# Patient Record
Sex: MALE | Race: OTHER | HISPANIC OR LATINO
[De-identification: names, ages, dates, MRNs, and addresses within clinical notes are randomized per-mention and may not be internally consistent; named-entity substitution may affect disease eponyms.]

---

## 2017-05-22 ENCOUNTER — APPOINTMENT (OUTPATIENT)
Dept: PULMONOLOGY | Facility: CLINIC | Age: 82
End: 2017-05-22

## 2017-10-04 ENCOUNTER — INPATIENT (INPATIENT)
Facility: HOSPITAL | Age: 82
LOS: 2 days | Discharge: HOSPICE | DRG: 65 | End: 2017-10-07
Attending: SURGERY | Admitting: SURGERY
Payer: MEDICARE

## 2017-10-04 VITALS
HEIGHT: 65 IN | WEIGHT: 130.07 LBS | OXYGEN SATURATION: 97 % | DIASTOLIC BLOOD PRESSURE: 82 MMHG | TEMPERATURE: 98 F | HEART RATE: 98 BPM | RESPIRATION RATE: 18 BRPM | SYSTOLIC BLOOD PRESSURE: 136 MMHG

## 2017-10-04 DIAGNOSIS — I62.00 NONTRAUMATIC SUBDURAL HEMORRHAGE, UNSPECIFIED: ICD-10-CM

## 2017-10-04 LAB
ALBUMIN SERPL ELPH-MCNC: 4.3 G/DL — SIGNIFICANT CHANGE UP (ref 3.3–5.2)
ALP SERPL-CCNC: 66 U/L — SIGNIFICANT CHANGE UP (ref 40–120)
ALT FLD-CCNC: 25 U/L — SIGNIFICANT CHANGE UP
ANION GAP SERPL CALC-SCNC: 11 MMOL/L — SIGNIFICANT CHANGE UP (ref 5–17)
APPEARANCE UR: CLEAR — SIGNIFICANT CHANGE UP
APTT BLD: 29.5 SEC — SIGNIFICANT CHANGE UP (ref 27.5–37.4)
AST SERPL-CCNC: 23 U/L — SIGNIFICANT CHANGE UP
BASOPHILS # BLD AUTO: 0 K/UL — SIGNIFICANT CHANGE UP (ref 0–0.2)
BASOPHILS NFR BLD AUTO: 0.4 % — SIGNIFICANT CHANGE UP (ref 0–2)
BILIRUB SERPL-MCNC: 0.4 MG/DL — SIGNIFICANT CHANGE UP (ref 0.4–2)
BILIRUB UR-MCNC: NEGATIVE — SIGNIFICANT CHANGE UP
BLD GP AB SCN SERPL QL: SIGNIFICANT CHANGE UP
BUN SERPL-MCNC: 25 MG/DL — HIGH (ref 8–20)
CALCIUM SERPL-MCNC: 9.8 MG/DL — SIGNIFICANT CHANGE UP (ref 8.6–10.2)
CHLORIDE SERPL-SCNC: 100 MMOL/L — SIGNIFICANT CHANGE UP (ref 98–107)
CO2 SERPL-SCNC: 26 MMOL/L — SIGNIFICANT CHANGE UP (ref 22–29)
COLOR SPEC: YELLOW — SIGNIFICANT CHANGE UP
CREAT SERPL-MCNC: 1.24 MG/DL — SIGNIFICANT CHANGE UP (ref 0.5–1.3)
DIFF PNL FLD: NEGATIVE — SIGNIFICANT CHANGE UP
EOSINOPHIL # BLD AUTO: 0.7 K/UL — HIGH (ref 0–0.5)
EOSINOPHIL NFR BLD AUTO: 6.8 % — HIGH (ref 0–5)
EPI CELLS # UR: SIGNIFICANT CHANGE UP
GLUCOSE SERPL-MCNC: 160 MG/DL — HIGH (ref 70–115)
GLUCOSE UR QL: NEGATIVE MG/DL — SIGNIFICANT CHANGE UP
HCT VFR BLD CALC: 44.2 % — SIGNIFICANT CHANGE UP (ref 42–52)
HGB BLD-MCNC: 15 G/DL — SIGNIFICANT CHANGE UP (ref 14–18)
INR BLD: 1.08 RATIO — SIGNIFICANT CHANGE UP (ref 0.88–1.16)
KETONES UR-MCNC: NEGATIVE — SIGNIFICANT CHANGE UP
LEUKOCYTE ESTERASE UR-ACNC: NEGATIVE — SIGNIFICANT CHANGE UP
LYMPHOCYTES # BLD AUTO: 0.9 K/UL — LOW (ref 1–4.8)
LYMPHOCYTES # BLD AUTO: 9.5 % — LOW (ref 20–55)
MAGNESIUM SERPL-MCNC: 2.3 MG/DL — SIGNIFICANT CHANGE UP (ref 1.6–2.6)
MCHC RBC-ENTMCNC: 31.1 PG — HIGH (ref 27–31)
MCHC RBC-ENTMCNC: 33.9 G/DL — SIGNIFICANT CHANGE UP (ref 32–36)
MCV RBC AUTO: 91.7 FL — SIGNIFICANT CHANGE UP (ref 80–94)
MONOCYTES # BLD AUTO: 0.5 K/UL — SIGNIFICANT CHANGE UP (ref 0–0.8)
MONOCYTES NFR BLD AUTO: 5.3 % — SIGNIFICANT CHANGE UP (ref 3–10)
NEUTROPHILS # BLD AUTO: 7.6 K/UL — SIGNIFICANT CHANGE UP (ref 1.8–8)
NEUTROPHILS NFR BLD AUTO: 77.9 % — HIGH (ref 37–73)
NITRITE UR-MCNC: NEGATIVE — SIGNIFICANT CHANGE UP
PH UR: 6 — SIGNIFICANT CHANGE UP (ref 5–8)
PLATELET # BLD AUTO: 232 K/UL — SIGNIFICANT CHANGE UP (ref 150–400)
POTASSIUM SERPL-MCNC: 5.1 MMOL/L — SIGNIFICANT CHANGE UP (ref 3.5–5.3)
POTASSIUM SERPL-SCNC: 5.1 MMOL/L — SIGNIFICANT CHANGE UP (ref 3.5–5.3)
PROT SERPL-MCNC: 7.8 G/DL — SIGNIFICANT CHANGE UP (ref 6.6–8.7)
PROT UR-MCNC: 15 MG/DL
PROTHROM AB SERPL-ACNC: 11.9 SEC — SIGNIFICANT CHANGE UP (ref 9.8–12.7)
RBC # BLD: 4.82 M/UL — SIGNIFICANT CHANGE UP (ref 4.6–6.2)
RBC # FLD: 12.9 % — SIGNIFICANT CHANGE UP (ref 11–15.6)
SODIUM SERPL-SCNC: 137 MMOL/L — SIGNIFICANT CHANGE UP (ref 135–145)
SP GR SPEC: 1.01 — SIGNIFICANT CHANGE UP (ref 1.01–1.02)
TROPONIN T SERPL-MCNC: <0.01 NG/ML — SIGNIFICANT CHANGE UP (ref 0–0.06)
TSH SERPL-MCNC: 1.35 UIU/ML — SIGNIFICANT CHANGE UP (ref 0.27–4.2)
TYPE + AB SCN PNL BLD: SIGNIFICANT CHANGE UP
UROBILINOGEN FLD QL: NEGATIVE MG/DL — SIGNIFICANT CHANGE UP
WBC # BLD: 9.7 K/UL — SIGNIFICANT CHANGE UP (ref 4.8–10.8)
WBC # FLD AUTO: 9.7 K/UL — SIGNIFICANT CHANGE UP (ref 4.8–10.8)
WBC UR QL: SIGNIFICANT CHANGE UP

## 2017-10-04 PROCEDURE — 99222 1ST HOSP IP/OBS MODERATE 55: CPT

## 2017-10-04 PROCEDURE — 93010 ELECTROCARDIOGRAM REPORT: CPT

## 2017-10-04 PROCEDURE — 99285 EMERGENCY DEPT VISIT HI MDM: CPT

## 2017-10-04 PROCEDURE — 70450 CT HEAD/BRAIN W/O DYE: CPT | Mod: 26,77

## 2017-10-04 PROCEDURE — 71010: CPT | Mod: 26

## 2017-10-04 PROCEDURE — 70450 CT HEAD/BRAIN W/O DYE: CPT | Mod: 26

## 2017-10-04 RX ORDER — GLUCAGON INJECTION, SOLUTION 0.5 MG/.1ML
1 INJECTION, SOLUTION SUBCUTANEOUS ONCE
Qty: 0 | Refills: 0 | Status: DISCONTINUED | OUTPATIENT
Start: 2017-10-04 | End: 2017-10-05

## 2017-10-04 RX ORDER — LEVETIRACETAM 250 MG/1
1000 TABLET, FILM COATED ORAL ONCE
Qty: 0 | Refills: 0 | Status: COMPLETED | OUTPATIENT
Start: 2017-10-04 | End: 2017-10-04

## 2017-10-04 RX ORDER — LEVETIRACETAM 250 MG/1
500 TABLET, FILM COATED ORAL EVERY 12 HOURS
Qty: 0 | Refills: 0 | Status: DISCONTINUED | OUTPATIENT
Start: 2017-10-04 | End: 2017-10-07

## 2017-10-04 RX ORDER — DEXTROSE 50 % IN WATER 50 %
25 SYRINGE (ML) INTRAVENOUS ONCE
Qty: 0 | Refills: 0 | Status: DISCONTINUED | OUTPATIENT
Start: 2017-10-04 | End: 2017-10-05

## 2017-10-04 RX ORDER — MANNITOL
60 POWDER (GRAM) MISCELLANEOUS ONCE
Qty: 0 | Refills: 0 | Status: DISCONTINUED | OUTPATIENT
Start: 2017-10-04 | End: 2017-10-04

## 2017-10-04 RX ORDER — SODIUM CHLORIDE 9 MG/ML
1000 INJECTION INTRAMUSCULAR; INTRAVENOUS; SUBCUTANEOUS
Qty: 0 | Refills: 0 | Status: DISCONTINUED | OUTPATIENT
Start: 2017-10-04 | End: 2017-10-05

## 2017-10-04 RX ORDER — SODIUM CHLORIDE 9 MG/ML
1000 INJECTION INTRAMUSCULAR; INTRAVENOUS; SUBCUTANEOUS ONCE
Qty: 0 | Refills: 0 | Status: COMPLETED | OUTPATIENT
Start: 2017-10-04 | End: 2017-10-04

## 2017-10-04 RX ORDER — INSULIN LISPRO 100/ML
VIAL (ML) SUBCUTANEOUS
Qty: 0 | Refills: 0 | Status: DISCONTINUED | OUTPATIENT
Start: 2017-10-04 | End: 2017-10-05

## 2017-10-04 RX ORDER — SODIUM CHLORIDE 9 MG/ML
1000 INJECTION, SOLUTION INTRAVENOUS
Qty: 0 | Refills: 0 | Status: DISCONTINUED | OUTPATIENT
Start: 2017-10-04 | End: 2017-10-06

## 2017-10-04 RX ORDER — DEXTROSE 50 % IN WATER 50 %
1 SYRINGE (ML) INTRAVENOUS ONCE
Qty: 0 | Refills: 0 | Status: DISCONTINUED | OUTPATIENT
Start: 2017-10-04 | End: 2017-10-05

## 2017-10-04 RX ORDER — DEXTROSE 50 % IN WATER 50 %
12.5 SYRINGE (ML) INTRAVENOUS ONCE
Qty: 0 | Refills: 0 | Status: DISCONTINUED | OUTPATIENT
Start: 2017-10-04 | End: 2017-10-05

## 2017-10-04 RX ADMIN — LEVETIRACETAM 400 MILLIGRAM(S): 250 TABLET, FILM COATED ORAL at 15:57

## 2017-10-04 RX ADMIN — SODIUM CHLORIDE 1000 MILLILITER(S): 9 INJECTION INTRAMUSCULAR; INTRAVENOUS; SUBCUTANEOUS at 14:52

## 2017-10-04 RX ADMIN — SODIUM CHLORIDE 100 MILLILITER(S): 9 INJECTION INTRAMUSCULAR; INTRAVENOUS; SUBCUTANEOUS at 19:08

## 2017-10-04 NOTE — ED ADULT NURSE NOTE - OBJECTIVE STATEMENT
Patient brought to the ED from home with her family. Patient states that the patient has been having AMS since Monday. Family states that he has been becoming more confused each day. Patient A/Ox2 at this time, patients baseline A/Ox4 per family. Patient denies any pain, but does appear to be weak at this time. NAD noted.

## 2017-10-04 NOTE — ED STATDOCS - PROGRESS NOTE DETAILS
82 year old male, with hx of HTN, asthma, and stent, with family at bedside presenting to the ED for confusion x 2 days. Pt's daughter states that the pt has been answering questions with answers that do not pertain to the question. His daughter states that the pt has had generalized weakness and has had trouble putting his clothes on and taking them off. As per daughter, pt does not usually exhibit these sx. Concern for CVA or other cause of delirium. Will transfer to the main ED on a monitor for further evaluation by another provider.

## 2017-10-04 NOTE — PROGRESS NOTE ADULT - SUBJECTIVE AND OBJECTIVE BOX
NEUROSURGERY PROGRESS NOTE:  PATIENT WAS ORIENTED TO SELF EARLIER BUT NOW IS NOT. HE IS AWAKE, ALERT, FOLLOWING COMMANDS, SOMETIME NEED REPEAT REQUESTS,   POSS NEW WEAKNESS RIGHT UPPER EXTREM. PATIENT DENIES PAIN.        MEDICATIONS  (STANDING):  dextrose 5%. 1000 milliLiter(s) (50 mL/Hr) IV Continuous <Continuous>  dextrose 50% Injectable 12.5 Gram(s) IV Push once  dextrose 50% Injectable 25 Gram(s) IV Push once  dextrose 50% Injectable 25 Gram(s) IV Push once  insulin lispro (HumaLOG) corrective regimen sliding scale   SubCutaneous three times a day before meals  levETIRAcetam  IVPB 500 milliGRAM(s) IV Intermittent every 12 hours  sodium chloride 0.9%. 1000 milliLiter(s) (100 mL/Hr) IV Continuous <Continuous>    MEDICATIONS  (PRN):  dextrose Gel 1 Dose(s) Oral once PRN Blood Glucose LESS THAN 70 milliGRAM(s)/deciliter  glucagon  Injectable 1 milliGRAM(s) IntraMuscular once PRN Glucose LESS THAN 70 milligrams/deciliter      Allergies: No Known Allergies      Vital Signs Last 24 Hrs  T(C): 36.7 (04 Oct 2017 20:00), Max: 36.7 (04 Oct 2017 17:56)  T(F): 98 (04 Oct 2017 20:00), Max: 98.1 (04 Oct 2017 17:56)  HR: 83 (04 Oct 2017 20:00) (61 - 98)  BP: 160/18 (04 Oct 2017 20:00) (131/60 - 160/18)  BP(mean): 112 (04 Oct 2017 20:00) (91 - 112)  RR: 22 (04 Oct 2017 19:00) (17 - 22)  SpO2: 95% (04 Oct 2017 20:00) (93% - 97%)    PHYSICAL EXAM:  GENERAL: NAD, well-groomed, well-developed  HEAD:  Atraumatic, Normocephalic  EYES: EOMI, PERRLA, conjunctiva and sclera clear  NECK: Supple, No JVD  NERVOUS SYSTEM:  ALERT, EYES OPEN, ANSWERING QUESTIONS, ECHOLALIA,   NOT ORIENTED TO SELF, PLACE OR DATE,   Motor Strength 5/5 LUE, 4+/5 RUE,  B/L ower extremities 5/5   LYMPH: No lymphadenopathy noted  SKIN: No rashes or lesions      LABS:                        15.0   9.7   )-----------( 232      ( 04 Oct 2017 15:02 )             44.2     10-    137  |  100  |  25.0<H>  ----------------------------<  160<H>  5.1   |  26.0  |  1.24    Ca    9.8      04 Oct 2017 15:02  Mg     2.3     10-04    TPro  7.8  /  Alb  4.3  /  TBili  0.4  /  DBili  x   /  AST  23  /  ALT  25  /  AlkPhos  66  10-04    PT/INR - ( 04 Oct 2017 15:02 )   PT: 11.9 sec;   INR: 1.08 ratio         PTT - ( 04 Oct 2017 15:02 )  PTT:29.5 sec  Urinalysis Basic - ( 04 Oct 2017 15:10 )    Color: Yellow / Appearance: Clear / S.015 / pH: x  Gluc: x / Ketone: Negative  / Bili: Negative / Urobili: Negative mg/dL   Blood: x / Protein: 15 mg/dL / Nitrite: Negative   Leuk Esterase: Negative / RBC: x / WBC 0-2   Sq Epi: x / Non Sq Epi: Occasional / Bacteria: x        RADIOLOGY & ADDITIONAL TESTS:  10/4  CT HEAD F/U EXAM: Stable left subdural hematoma as described. Right-sided midline shift  measuring 1.4 cm with subfalcine and subuncal herniation.

## 2017-10-04 NOTE — PROGRESS NOTE ADULT - SUBJECTIVE AND OBJECTIVE BOX
NSFRANNY Attg:    Patient seen.  See full consult.  Briefly, the patient has a 2-3 day history of increased confusion and word-finding difficulty.    Exam:  A and O x 1  oriented to self, disoriented to time and place  PERRL  EOMI  FS grossly  + expressive speech difficulty with word-finding difficulty  impaired naming  AVILA to command  L 5/5  R 4-4+/5    CT head -- large mixed-density subacute left SDH with 1.2 cm of midline shift    I have explained the risks, benefits, and alternatives of surgical intervention to the patient and his family as below.  Because the wife is [place] speaking, I used a medically licensed .   benefit: hopeful decompression and relief of mass effect, hopeful improvement in symptoms, hopeful prevention of progression of deficit  alternative: no surgical intervention; I have explained that the subdural is too large to resolve without surgical intervention and will likely increase in size with time; I have further explained that the natural history of a subacute subdural of this size without surgical intervention is progressive neurologic deficit (AMS, worsening weakness, difficulty with balance/falls, worsening speech)  risks: bleeding, infection, CSF leak, failure or procedure, need for re-operation, seizure, stroke, coma, death, DVT, PE, MI, UTI, PNA, difficulty/failure to intubate or extubate, new or worsening numbness, tingling, weakness, paralysis, sensory changes, difficulty/inability to walk, expressive or receptive speech difficulty, altered personality or judgement  The patient's wife verbalizes her understanding of the above.  She is refusing surgical intervention at this time, however she wishes to further discuss the options with her family.    A/P:  neurologically stable, imaging as above  patient's family currently refusing surgical intervention at this time  hold ASA  Kera for sz ppx  ICU care for now  will also discuss with patient's son, Brown De La Garza (Ivan)go -- [phone] NSFRANNY Attg:    Patient seen.  See full consult.  Briefly, the patient has a 2-3 day history of increased confusion and word-finding difficulty.    Exam:  A and O x 1  oriented to self, disoriented to time and place  PERRL  EOMI  FS grossly  + expressive speech difficulty with word-finding difficulty  impaired naming  AVILA to command  L 5/5  R 4-4+/5    CT head -- large mixed-density subacute left SDH with 1.2 cm of midline shift    I have explained the risks, benefits, and alternatives of surgical intervention to the patient and his family as below.  Because the wife is [place] speaking, I used a medically licensed .   benefit: hopeful decompression and relief of mass effect, hopeful improvement in symptoms, hopeful prevention of progression of deficit  alternative: no surgical intervention; I have explained that the subdural is too large to resolve without surgical intervention and will likely increase in size with time; I have further explained that the natural history of a subacute subdural of this size without surgical intervention is progressive neurologic deficit (AMS, worsening weakness, difficulty with balance/falls, worsening speech)  risks: bleeding, infection, CSF leak, failure or procedure, need for re-operation, seizure, stroke, coma, death, DVT, PE, MI, UTI, PNA, difficulty/failure to intubate or extubate, new or worsening numbness, tingling, weakness, paralysis, sensory changes, difficulty/inability to walk, expressive or receptive speech difficulty, altered personality or judgement  The patient's wife verbalizes her understanding of the above.  She is refusing surgical intervention at this time, however she wishes to further discuss the options with her family.    A/P:  neurologically stable, imaging as above  patient's family currently refusing surgical intervention at this time  hold ASA  Kera for sz ppx  ICU care for now  will also discuss with patient's son, Brown De La Garza (Ivan)go -- [phone]

## 2017-10-04 NOTE — ED ADULT TRIAGE NOTE - CHIEF COMPLAINT QUOTE
C/O dizziness and confusion x 2 days. Upon every assessment question, patient responds 1236. Daughter states patient usually alert and oriented x4 and ambulatory with steady gait. C/O dizziness and confusion x 2 days. Upon every assessment question, patient responds 1236 or 1936. Daughter states patient usually alert and oriented x4 and ambulatory with steady gait. Ambulatory with c/o dizziness, no facial asym present. Able to AVILA with strength and purpose. Follows commands. Denies recent trauma, injury, or falls.

## 2017-10-04 NOTE — H&P ADULT - PROBLEM SELECTOR PLAN 1
admit to SICU  NPO with IV fluids  Pain management   Q1 neuro checks  repeat CT to evaluate the bleed  F/U neurosurgery recs

## 2017-10-04 NOTE — H&P ADULT - NSHPPHYSICALEXAM_GEN_ALL_CORE
Primary Survey:  A: intact  B: CTAB  C: b/l femoral 2+  D: GCS: 14, pupils 3 mm RRR  E: no visible sign of trauma    Secondary Survey  Pt alert, Ox1  Chest: CTAB  CVS: S1S2  abd: soft, non tender, no guarding and rigidity  Cspine: non tender  chest: non tender  pelvis: stable

## 2017-10-04 NOTE — ED PROVIDER NOTE - MEDICAL DECISION MAKING DETAILS
82M with new onset AMS since Monday - no focal deficits, but pt confused/ disoriented - plan to check CTh/ r/o infectious/ metabolic causes, adm

## 2017-10-04 NOTE — ED PROVIDER NOTE - OBJECTIVE STATEMENT
82M with h/o HTN, asthma, AD with stent, normally A&o x 3, presenting with new onset confusion since Monday. Family notes confused speech, abnormal behaviors and some hesitancy w/ gait. They deny any , slurred speech, facial droop, unilateral weakness, fevers, chills, cough, vomiting, diarrhea.  They state patient is highly functional at baseline w/o h/o dementia.     PMD: Dr. Segura 82M with h/o HTN, asthma, AD with stent, normally A&0 x 3, presenting with new onset confusion since Monday. Family notes confused speech, abnormal behaviors and some hesitancy w/ gait. They deny any , slurred speech, facial droop, unilateral weakness, fevers, chills, cough, vomiting, diarrhea.  They state patient is highly functional at baseline w/o h/o dementia.     PMD: Dr. Segura

## 2017-10-04 NOTE — H&P ADULT - ASSESSMENT
81 y/o M with SDH  - neurosurgery evaluated the pt and recommended surgery but the family is refusing

## 2017-10-04 NOTE — ED ADULT NURSE NOTE - CHPI ED SYMPTOMS NEG
no chills/no vomiting/no decreased eating/drinking/no fever/no nausea/no tingling/no pain/no numbness/no dizziness

## 2017-10-04 NOTE — H&P ADULT - HISTORY OF PRESENT ILLNESS
83 y/o M brought to the ED by family members with complaints of altered mental status for past few days. As per the family they are not sure if the pt had a fall. pt is able to ambulate himself. family and pt denies N/V/F  PMH: HTN, high cholesterol, CAD, asthma  PSH: cardiac stents  Medication: Exforge 5/160 OD, lipitor 20 OD, montelukast 10 OD, aspirin 325  Cardiologist: Dr. morena Braun  PMD: Dr. Colleen Pang      Primary Survey:  A: intact  B: CTAB  C: b/l femoral 2+  D: GCS: 14, pupils 3 mm RRR  E: no visible sign of trauma    Secondary Survey  Pt alert, Ox1  Chest: CTAB  CVS: S1S2  abd: soft, non tender, no guarding and rigidity  Cspine: non tender  chest: non tender  pelvis: stable

## 2017-10-04 NOTE — ED PROVIDER NOTE - PROGRESS NOTE DETAILS
Once CT results obtained, communicated with neurosurgery for consultation, who is now at bedside. Daughter questioned about recent falls and she states they were asking him but he denied although they noticed ecchymosis to R forehead. Trauma surgery consulted Per Dr Saha family declining surgery at present. Will admit to SICU

## 2017-10-04 NOTE — CONSULT NOTE ADULT - ASSESSMENT
A/P: 81 y/o male PMH CAD s/p stent on , HTN, HLD, asthma admitted with AMS ?possible fall, found with large left frontal parietal mixed density SDH on CT head.  - Patient seen with Dr. Saha at bedside who has explained the risks, benefits, and alternatives of surgical intervention to family and patient with  (wife speaks Amharic). Wife is currently declining surgical intervention at this time, and would like to discuss the options with her family  - Admit to SICU  - Neurochecks Q1 hour, HOB 30 degrees, BP control SBP goal < 150  - Hold all antiplatelets/anticoagulation  - Keppra 500 BID Q12 x total 7 days for seizure ppx   - Supportive care per SICU A/P: 83 y/o male PMH CAD s/p stent on , HTN, HLD, asthma admitted with AMS ?possible fall, found with large left frontal parietal mixed density SDH on CT head.  - Patient seen with Dr. Saha at bedside who has explained the risks, benefits, and alternatives of surgical intervention to family and patient with  (wife speaks [place]ian). Wife is currently declining surgical intervention at this time, and would like to discuss the options with her family  - Admit to SICU  - Neurochecks Q1 hour, HOB 30 degrees, BP control SBP goal < 150  - Hold all antiplatelets/anticoagulation  - Keppra 500 BID Q12 x total 7 days for seizure ppx  - P2Y12 pending  - Repeat CT head 6 hours   - Supportive care per SICU

## 2017-10-04 NOTE — CONSULT NOTE ADULT - SUBJECTIVE AND OBJECTIVE BOX
HISTORY OF PRESENT ILLNESS:   83 y/o male PMH CAD s/p stent on , HTN, HLD, asthma brought to the ED by his wife and daughter due to altered mental status since Monday 10/2/17 when he started to have word finding difficulties when speaking, and then began having irregular behavior on Tuesday such as urinating on the floor. At baseline, patient is AOx3 and is independent with his functions of daily living without any history of dementia. Last took ASA last night 10/3/17 around 21:30. Patient/wife/daughter deny history of recent trauma/falls although ecchymosis is noted on the right side of his head, deny history of cranial bleeds in the past, recent illness. Patient denies headache, n/v/d, chest pain, palpitations, SOB, weakness, paresthesias, dizziness, fever, chills, sweats, however due to patient's altered mental status/confusion, ROS may not be accurate.     GCS: 14 (eye- 4, verbal- 4, motor-6)    PAST MEDICAL & SURGICAL HISTORY:  CAD (coronary artery disease)  Asthma  HTN (hypertension)  No significant past surgical history    SOCIAL HISTORY:  Tobacco Use: Denies  EtOH use: Denies  Substance: Denies    Allergies No Known Allergies Intolerances    REVIEW OF SYSTEMS unreliable due to patient's AMS, however per patient and some per wife/daughter negative except as noted in HPI    MEDICATIONS:  Antibiotics:  Neuro:  Anticoagulation:  OTHER:  dextrose 50% Injectable 12.5 Gram(s) IV Push once  dextrose 50% Injectable 25 Gram(s) IV Push once  dextrose 50% Injectable 25 Gram(s) IV Push once  dextrose Gel 1 Dose(s) Oral once PRN  glucagon  Injectable 1 milliGRAM(s) IntraMuscular once PRN  insulin lispro (HumaLOG) corrective regimen sliding scale   SubCutaneous three times a day before meals  IVF:  dextrose 5%. 1000 milliLiter(s) IV Continuous <Continuous>    Vital Signs Last 24 Hrs  T(C): 36.6 (04 Oct 2017 13:44), Max: 36.6 (04 Oct 2017 13:44)  T(F): 97.8 (04 Oct 2017 13:44), Max: 97.8 (04 Oct 2017 13:44)  HR: 92 (04 Oct 2017 16:49) (92 - 98)  BP: 131/60 (04 Oct 2017 16:49) (131/60 - 150/68)  BP(mean): --  RR: 18 (04 Oct 2017 16:49) (17 - 18)  SpO2: 96% (04 Oct 2017 16:49) (96% - 97%)    PHYSICAL EXAM:  GCS: 14 (eye- 4, verbal- 4, motor-6)  GENERAL: NAD, well-groomed, well-developed  HEAD: +ecchymosis R forehead; normocephalic  EYES: Conjunctiva and sclera clear; + arcus senilis b/l  ENMT: good dentition, no lesions  NECK: Supple  MENTAL STATUS: AAO to self only; Awake; Opens eyes spontaneously; Conversant with some word finding difficulty; following simple commands  CRANIAL NERVES: PERRL (~3mm); EOMI; visual fields grossly intact; no facial asymmetry; facial sensation grossly intact to light touch b/l;  tongue midline; palate rises symmetrically; gag reflex intact  MOTOR: strength 5/5 LUE/LLE, 4+/5 RUE, 4/5 RLE  COORDINATION: No pronator drift; gait not assessed  SENSATION: grossly intact to light touch all extremities  CHEST/LUNG: Clear to auscultation bilaterally; no rales, rhonchi, wheezing  HEART: +S1/+S2  ABDOMEN: Soft, nontender, nondistended  EXTREMITIES: + abrasion R elbow; no clubbing, cyanosis, or edema  SKIN: Warm, dry    LABS:                        15.0   9.7   )-----------( 232      ( 04 Oct 2017 15:02 )             44.2     10-04    137  |  100  |  25.0<H>  ----------------------------<  160<H>  5.1   |  26.0  |  1.24    Ca    9.8      04 Oct 2017 15:02  Mg     2.3     10-04    TPro  7.8  /  Alb  4.3  /  TBili  0.4  /  DBili  x   /  AST  23  /  ALT  25  /  AlkPhos  66  10-04    PT/INR - ( 04 Oct 2017 15:02 )   PT: 11.9 sec;   INR: 1.08 ratio         PTT - ( 04 Oct 2017 15:02 )  PTT:29.5 sec  Urinalysis Basic - ( 04 Oct 2017 15:10 )    Color: Yellow / Appearance: Clear / S.015 / pH: x  Gluc: x / Ketone: Negative  / Bili: Negative / Urobili: Negative mg/dL   Blood: x / Protein: 15 mg/dL / Nitrite: Negative   Leuk Esterase: Negative / RBC: x / WBC 0-2   Sq Epi: x / Non Sq Epi: Occasional / Bacteria: x    RADIOLOGY & ADDITIONAL STUDIES:  < from: CT Head No Cont (10.04.17 @ 15:04) >  FINDINGS: Moderate to large left frontal parietal convexity subdural   hemorrhage measuring 2.4 cm in thickness. Moderate adjacent mass effect.   1.5 similar left to right midline shift. The left lateral ventricle is   effaced. Third ventricle is effaced. Mild left-sided uncal herniation.    There is periventricular and subcortical white matter hypodensity without   mass effect, nonspecific, likely representing moderate chronic   microvascular ischemic changes. There is no compelling evidence for an   acute transcortical infarction. There is no other evidence of mass, mass   effect, midline shift or additional extra-axial fluid collection. The   lateral ventricles and cortical sulci are otherwise age-appropriate in   size and configuration.     Mild polypoid inflammatory mucosal changes are seen throughout the   various portions of the paranasal sinuses. The orbits and mastoid air   cells are unremarkable. The calvarium is intact.    IMPRESSION:      Acute moderate to large left frontal parietal convexity   mixed density subdural hemorrhage with a 1.5 cm left-to-right midline   shift. Findings discussed with Dr. Vick.    < end of copied text >

## 2017-10-05 DIAGNOSIS — Z51.5 ENCOUNTER FOR PALLIATIVE CARE: ICD-10-CM

## 2017-10-05 DIAGNOSIS — R53.81 OTHER MALAISE: ICD-10-CM

## 2017-10-05 DIAGNOSIS — R64 CACHEXIA: ICD-10-CM

## 2017-10-05 LAB
ANION GAP SERPL CALC-SCNC: 11 MMOL/L — SIGNIFICANT CHANGE UP (ref 5–17)
BASOPHILS # BLD AUTO: 0 K/UL — SIGNIFICANT CHANGE UP (ref 0–0.2)
BASOPHILS NFR BLD AUTO: 0.4 % — SIGNIFICANT CHANGE UP (ref 0–2)
BUN SERPL-MCNC: 18 MG/DL — SIGNIFICANT CHANGE UP (ref 8–20)
CALCIUM SERPL-MCNC: 9.1 MG/DL — SIGNIFICANT CHANGE UP (ref 8.6–10.2)
CHLORIDE SERPL-SCNC: 106 MMOL/L — SIGNIFICANT CHANGE UP (ref 98–107)
CO2 SERPL-SCNC: 24 MMOL/L — SIGNIFICANT CHANGE UP (ref 22–29)
CREAT SERPL-MCNC: 1 MG/DL — SIGNIFICANT CHANGE UP (ref 0.5–1.3)
CULTURE RESULTS: SIGNIFICANT CHANGE UP
EOSINOPHIL # BLD AUTO: 1.6 K/UL — HIGH (ref 0–0.5)
EOSINOPHIL NFR BLD AUTO: 14.6 % — HIGH (ref 0–5)
GLUCOSE SERPL-MCNC: 88 MG/DL — SIGNIFICANT CHANGE UP (ref 70–115)
HBA1C BLD-MCNC: 5.8 % — HIGH (ref 4–5.6)
HCT VFR BLD CALC: 43.4 % — SIGNIFICANT CHANGE UP (ref 42–52)
HGB BLD-MCNC: 14.8 G/DL — SIGNIFICANT CHANGE UP (ref 14–18)
INR BLD: 1.12 RATIO — SIGNIFICANT CHANGE UP (ref 0.88–1.16)
LYMPHOCYTES # BLD AUTO: 1.4 K/UL — SIGNIFICANT CHANGE UP (ref 1–4.8)
LYMPHOCYTES # BLD AUTO: 12.1 % — LOW (ref 20–55)
MAGNESIUM SERPL-MCNC: 2 MG/DL — SIGNIFICANT CHANGE UP (ref 1.6–2.6)
MCHC RBC-ENTMCNC: 30.9 PG — SIGNIFICANT CHANGE UP (ref 27–31)
MCHC RBC-ENTMCNC: 34.1 G/DL — SIGNIFICANT CHANGE UP (ref 32–36)
MCV RBC AUTO: 90.6 FL — SIGNIFICANT CHANGE UP (ref 80–94)
MONOCYTES # BLD AUTO: 0.8 K/UL — SIGNIFICANT CHANGE UP (ref 0–0.8)
MONOCYTES NFR BLD AUTO: 7.2 % — SIGNIFICANT CHANGE UP (ref 3–10)
NEUTROPHILS # BLD AUTO: 7.4 K/UL — SIGNIFICANT CHANGE UP (ref 1.8–8)
NEUTROPHILS NFR BLD AUTO: 65.5 % — SIGNIFICANT CHANGE UP (ref 37–73)
PHOSPHATE SERPL-MCNC: 3 MG/DL — SIGNIFICANT CHANGE UP (ref 2.4–4.7)
PLATELET # BLD AUTO: 189 K/UL — SIGNIFICANT CHANGE UP (ref 150–400)
POTASSIUM SERPL-MCNC: 4.1 MMOL/L — SIGNIFICANT CHANGE UP (ref 3.5–5.3)
POTASSIUM SERPL-SCNC: 4.1 MMOL/L — SIGNIFICANT CHANGE UP (ref 3.5–5.3)
PROTHROM AB SERPL-ACNC: 12.3 SEC — SIGNIFICANT CHANGE UP (ref 9.8–12.7)
RBC # BLD: 4.79 M/UL — SIGNIFICANT CHANGE UP (ref 4.6–6.2)
RBC # FLD: 12.8 % — SIGNIFICANT CHANGE UP (ref 11–15.6)
SODIUM SERPL-SCNC: 141 MMOL/L — SIGNIFICANT CHANGE UP (ref 135–145)
SPECIMEN SOURCE: SIGNIFICANT CHANGE UP
WBC # BLD: 11.3 K/UL — HIGH (ref 4.8–10.8)
WBC # FLD AUTO: 11.3 K/UL — HIGH (ref 4.8–10.8)

## 2017-10-05 PROCEDURE — 99223 1ST HOSP IP/OBS HIGH 75: CPT

## 2017-10-05 PROCEDURE — 99291 CRITICAL CARE FIRST HOUR: CPT

## 2017-10-05 PROCEDURE — 99232 SBSQ HOSP IP/OBS MODERATE 35: CPT

## 2017-10-05 PROCEDURE — 99497 ADVNCD CARE PLAN 30 MIN: CPT | Mod: 25

## 2017-10-05 RX ORDER — MORPHINE SULFATE 50 MG/1
2 CAPSULE, EXTENDED RELEASE ORAL EVERY 4 HOURS
Qty: 0 | Refills: 0 | Status: DISCONTINUED | OUTPATIENT
Start: 2017-10-05 | End: 2017-10-06

## 2017-10-05 RX ORDER — AMLODIPINE BESYLATE 2.5 MG/1
5 TABLET ORAL DAILY
Qty: 0 | Refills: 0 | Status: DISCONTINUED | OUTPATIENT
Start: 2017-10-06 | End: 2017-10-06

## 2017-10-05 RX ORDER — ALBUTEROL 90 UG/1
1 AEROSOL, METERED ORAL EVERY 4 HOURS
Qty: 0 | Refills: 0 | Status: DISCONTINUED | OUTPATIENT
Start: 2017-10-05 | End: 2017-10-06

## 2017-10-05 RX ORDER — METOPROLOL TARTRATE 50 MG
2.5 TABLET ORAL ONCE
Qty: 0 | Refills: 0 | Status: COMPLETED | OUTPATIENT
Start: 2017-10-05 | End: 2017-10-05

## 2017-10-05 RX ORDER — VALSARTAN 80 MG/1
160 TABLET ORAL DAILY
Qty: 0 | Refills: 0 | Status: DISCONTINUED | OUTPATIENT
Start: 2017-10-06 | End: 2017-10-06

## 2017-10-05 RX ORDER — BUDESONIDE AND FORMOTEROL FUMARATE DIHYDRATE 160; 4.5 UG/1; UG/1
2 AEROSOL RESPIRATORY (INHALATION)
Qty: 0 | Refills: 0 | Status: DISCONTINUED | OUTPATIENT
Start: 2017-10-05 | End: 2017-10-06

## 2017-10-05 RX ORDER — ROBINUL 0.2 MG/ML
0.4 INJECTION INTRAMUSCULAR; INTRAVENOUS EVERY 4 HOURS
Qty: 0 | Refills: 0 | Status: DISCONTINUED | OUTPATIENT
Start: 2017-10-05 | End: 2017-10-06

## 2017-10-05 RX ADMIN — LEVETIRACETAM 420 MILLIGRAM(S): 250 TABLET, FILM COATED ORAL at 05:00

## 2017-10-05 RX ADMIN — Medication 2.5 MILLIGRAM(S): at 12:34

## 2017-10-05 RX ADMIN — SODIUM CHLORIDE 100 MILLILITER(S): 9 INJECTION INTRAMUSCULAR; INTRAVENOUS; SUBCUTANEOUS at 13:11

## 2017-10-05 NOTE — PROGRESS NOTE ADULT - SUBJECTIVE AND OBJECTIVE BOX
INTERVAL HPI/OVERNIGHT EVENTS:  82y Male with large left frontal parietal mixed density SDH with 1.5 cm left to right midline shift. Patient seen lying comfortably in bed, no acute distress. Last night appeared more lethargic, repeat CT head done which showed stable left SDH and stable midline shift. No acute events overnight. Per SICU team wife appears to be adamant about not having surgery, although apparently more family is coming today.     Vital Signs Last 24 Hrs  T(C): 37 (05 Oct 2017 08:00), Max: 37 (05 Oct 2017 08:00)  T(F): 98.6 (05 Oct 2017 08:00), Max: 98.6 (05 Oct 2017 08:00)  HR: 77 (05 Oct 2017 07:00) (56 - 98)  BP: 159/71 (05 Oct 2017 07:00) (130/60 - 174/76)  BP(mean): 102 (05 Oct 2017 07:00) (86 - 112)  RR: 20 (05 Oct 2017 07:00) (17 - 148)  SpO2: 94% (05 Oct 2017 07:00) (92% - 97%)    PHYSICAL EXAM:  GENERAL: NAD, well-groomed, well-developed  HEAD:  R forehead ecchymosis  MENTAL STATUS: Not oriented to self, place, or time. Unable to tell me his name today. Awake; Opens eyes spontaneously; more noticeable echolalia today, expressive aphasia/word finding difficulty remains; following some very simple commands; mimics well.  CRANIAL NERVES: PERRL (~3mm); unable to assess EOMI as patient was not following that specific command however grossly EOMI with tracking; no facial asymmetry; facial sensation grossly intact to light touch b/l  MOTOR: strength 5/5 LUE/LLE grossly, 3/5 RUE, 4/5 RLE  SENSATION: grossly intact to light touch all extremities  REFLEXES: + R pronator drift  CHEST/LUNG: Clear to auscultation bilaterally; no rales, rhonchi, wheezing  HEART: +S1/+S2; RRR  ABDOMEN: Soft, nontender, nondistended; bowel sounds present  SKIN: Warm, dry    LABS:                        14.8   11.3  )-----------( 189      ( 05 Oct 2017 05:59 )             43.4     10-05    141  |  106  |  18.0  ----------------------------<  88  4.1   |  24.0  |  1.00    Ca    9.1      05 Oct 2017 05:59  Phos  3.0     10  Mg     2.0     10-05    TPro  7.8  /  Alb  4.3  /  TBili  0.4  /  DBili  x   /  AST  23  /  ALT  25  /  AlkPhos  66  10    PT/INR - ( 05 Oct 2017 05:59 )   PT: 12.3 sec;   INR: 1.12 ratio         PTT - ( 04 Oct 2017 15:02 )  PTT:29.5 sec  Urinalysis Basic - ( 04 Oct 2017 15:10 )    Color: Yellow / Appearance: Clear / S.015 / pH: x  Gluc: x / Ketone: Negative  / Bili: Negative / Urobili: Negative mg/dL   Blood: x / Protein: 15 mg/dL / Nitrite: Negative   Leuk Esterase: Negative / RBC: x / WBC 0-2   Sq Epi: x / Non Sq Epi: Occasional / Bacteria: x    10-04 @ 07:  -  10-05 @ 07:00  --------------------------------------------------------  IN: 900 mL / OUT: 800 mL / NET: 100 mL    10-05 @ 07:  -  10-05 @ 08:29  --------------------------------------------------------  IN: 100 mL / OUT: 375 mL / NET: -275 mL    RADIOLOGY & ADDITIONAL TESTS:  - from: CT Head No Cont (10.04.17 @ 19:47) >  IMPRESSION:  Stable left subdural hematoma as described. Right-sided midline shift   measuring 1.4 cm with subfalcine and subuncal herniation.    - from: CT Head No Cont (10.04.17 @ 15:04)  IMPRESSION:      Acute moderate to large left frontal parietal convexity   mixed density subdural hemorrhage with a 1.5 cm left-to-right midline   shift. Findings discussed with Dr. Vick.

## 2017-10-05 NOTE — CONSULT NOTE ADULT - SUBJECTIVE AND OBJECTIVE BOX
HPI: 82M with PMH as listed admitted 10/4 with     PERTINENT PMH REVIEWED: Yes     PAST MEDICAL & SURGICAL HISTORY:  CAD (coronary artery disease)  Asthma  HTN (hypertension)  No significant past surgical history    SOCIAL HISTORY:                                     Admitted from:  home  SNF  [facility]     Surrogate/HCP/Guardian: Phone#:    FAMILY HISTORY:    Baseline ADLs (prior to admission):  Independent/ Dependent      Allergies    No Known Allergies    Present Symptoms:     Dyspnea: 0 1 2 3   Nausea/Vomiting: Yes No  Anxiety:  Yes No  Depression: Yes No  Fatigue: Yes No  Loss of appetite: Yes No    Pain:             Character-            Duration-            Effect-            Factors-            Frequency-            Location-            Severity-    Review of Systems: Reviewed                     Negative:                     Positive:  Unable to obtain due to poor mentation   All others negative    MEDICATIONS  (STANDING):  dextrose 5%. 1000 milliLiter(s) (50 mL/Hr) IV Continuous <Continuous>  dextrose 50% Injectable 12.5 Gram(s) IV Push once  dextrose 50% Injectable 25 Gram(s) IV Push once  dextrose 50% Injectable 25 Gram(s) IV Push once  insulin lispro (HumaLOG) corrective regimen sliding scale   SubCutaneous three times a day before meals  levETIRAcetam  IVPB 500 milliGRAM(s) IV Intermittent every 12 hours  metoprolol Injectable 2.5 milliGRAM(s) IV Push once  sodium chloride 0.9%. 1000 milliLiter(s) (100 mL/Hr) IV Continuous <Continuous>    MEDICATIONS  (PRN):  dextrose Gel 1 Dose(s) Oral once PRN Blood Glucose LESS THAN 70 milliGRAM(s)/deciliter  glucagon  Injectable 1 milliGRAM(s) IntraMuscular once PRN Glucose LESS THAN 70 milligrams/deciliter    PHYSICAL EXAM:    Vital Signs Last 24 Hrs  T(C): 36.6 (05 Oct 2017 11:00), Max: 37 (05 Oct 2017 08:00)  T(F): 97.9 (05 Oct 2017 11:00), Max: 98.6 (05 Oct 2017 08:00)  HR: 82 (05 Oct 2017 11:00) (56 - 98)  BP: 158/70 (05 Oct 2017 11:00) (130/60 - 174/79)  BP(mean): 101 (05 Oct 2017 11:00) (86 - 114)  RR: 53 (05 Oct 2017 11:00) (17 - 148)  SpO2: 95% (05 Oct 2017 11:00) (92% - 97%)    General: alert  oriented x ____ lethargic agitated                  cachexia  nonverbal  coma    Karnofsky:  %    HEENT: normal  dry mouth  ET tube/trach    Lungs: comfortable tachypnea/labored breathing  excessive secretions    CV: normal  tachycardia    GI: normal  distended  tender  no BS               PEG/NG/OG tube  constipation  last BM:     : normal  incontinent  oliguria/anuria  kramer    MSK: normal  weakness  edema             ambulatory  bedbound/wheelchair bound    Skin: normal  pressure ulcers- Stage_____  no rash    LABS:                      14.8   11.3  )-----------( 189      ( 05 Oct 2017 05:59 )             43.4     10-05    141  |  106  |  18.0  ----------------------------<  88  4.1   |  24.0  |  1.00    Ca    9.1      05 Oct 2017 05:59  Phos  3.0     10-05  Mg     2.0     10-05    TPro  7.8  /  Alb  4.3  /  TBili  0.4  /  DBili  x   /  AST  23  /  ALT  25  /  AlkPhos  66  10-04    PT/INR - ( 05 Oct 2017 05:59 )   PT: 12.3 sec;   INR: 1.12 ratio       PTT - ( 04 Oct 2017 15:02 )  PTT:29.5 sec  Urinalysis Basic - ( 04 Oct 2017 15:10 )    Color: Yellow / Appearance: Clear / S.015 / pH: x  Gluc: x / Ketone: Negative  / Bili: Negative / Urobili: Negative mg/dL   Blood: x / Protein: 15 mg/dL / Nitrite: Negative   Leuk Esterase: Negative / RBC: x / WBC 0-2   Sq Epi: x / Non Sq Epi: Occasional / Bacteria: x      I&O's Summary    04 Oct 2017 07:  -  05 Oct 2017 07:00  --------------------------------------------------------  IN: 900 mL / OUT: 800 mL / NET: 100 mL    05 Oct 2017 07:01  -  05 Oct 2017 11:39  --------------------------------------------------------  IN: 400 mL / OUT: 375 mL / NET: 25 mL        RADIOLOGY & ADDITIONAL STUDIES:    ADVANCE DIRECTIVES:   DNR YES NO  Completed on:                     MOLST  YES NO   Completed on:  Living Will  YES NO   Completed on: HPI: 82M with PMH as listed admitted 10/4 with altered mental status, he had a fall two weeks prior to admission. He was found to have large acute on subacute subdural hematoma.     PERTINENT PMH REVIEWED: Yes     PAST MEDICAL & SURGICAL HISTORY:  CAD (coronary artery disease)  Asthma  HTN (hypertension)  No significant past surgical history    SOCIAL HISTORY:  nonsmoker                                   Admitted from:  home      Surrogate - wife Monica     FAMILY HISTORY:    Baseline ADLs (prior to admission):  Independent     Allergies    No Known Allergies    Present Symptoms:     Dyspnea: 0   Nausea/Vomiting: No  Anxiety:  No  Depression: No  Fatigue: Yes   Loss of appetite: No    Pain: none             Character-            Duration-            Effect-            Factors-            Frequency-            Location-            Severity-    Review of Systems: Reviewed              Unable to obtain due to poor mentation   All others negative    MEDICATIONS  (STANDING):  dextrose 5%. 1000 milliLiter(s) (50 mL/Hr) IV Continuous <Continuous>  dextrose 50% Injectable 12.5 Gram(s) IV Push once  dextrose 50% Injectable 25 Gram(s) IV Push once  dextrose 50% Injectable 25 Gram(s) IV Push once  insulin lispro (HumaLOG) corrective regimen sliding scale   SubCutaneous three times a day before meals  levETIRAcetam  IVPB 500 milliGRAM(s) IV Intermittent every 12 hours  metoprolol Injectable 2.5 milliGRAM(s) IV Push once  sodium chloride 0.9%. 1000 milliLiter(s) (100 mL/Hr) IV Continuous <Continuous>    MEDICATIONS  (PRN):  dextrose Gel 1 Dose(s) Oral once PRN Blood Glucose LESS THAN 70 milliGRAM(s)/deciliter  glucagon  Injectable 1 milliGRAM(s) IntraMuscular once PRN Glucose LESS THAN 70 milligrams/deciliter    PHYSICAL EXAM:    Vital Signs Last 24 Hrs  T(C): 36.6 (05 Oct 2017 11:00), Max: 37 (05 Oct 2017 08:00)  T(F): 97.9 (05 Oct 2017 11:00), Max: 98.6 (05 Oct 2017 08:00)  HR: 82 (05 Oct 2017 11:00) (56 - 98)  BP: 158/70 (05 Oct 2017 11:00) (130/60 - 174/79)  BP(mean): 101 (05 Oct 2017 11:00) (86 - 114)  RR: 53 (05 Oct 2017 11:00) (17 - 148)  SpO2: 95% (05 Oct 2017 11:00) (92% - 97%)    General: alert     Karnofsky:  30 %    HEENT: normal      Lungs: comfortable     CV: normal      GI: normal      : normal     MSK: normal      Skin:  no rash    LABS:                      14.8   11.3  )-----------( 189      ( 05 Oct 2017 05:59 )             43.4     10-05    141  |  106  |  18.0  ----------------------------<  88  4.1   |  24.0  |  1.00    Ca    9.1      05 Oct 2017 05:59  Phos  3.0     10-  Mg     2.0     10    TPro  7.8  /  Alb  4.3  /  TBili  0.4  /  DBili  x   /  AST  23  /  ALT  25  /  AlkPhos  66  10-04    PT/INR - ( 05 Oct 2017 05:59 )   PT: 12.3 sec;   INR: 1.12 ratio       PTT - ( 04 Oct 2017 15:02 )  PTT:29.5 sec  Urinalysis Basic - ( 04 Oct 2017 15:10 )    Color: Yellow / Appearance: Clear / S.015 / pH: x  Gluc: x / Ketone: Negative  / Bili: Negative / Urobili: Negative mg/dL   Blood: x / Protein: 15 mg/dL / Nitrite: Negative   Leuk Esterase: Negative / RBC: x / WBC 0-2   Sq Epi: x / Non Sq Epi: Occasional / Bacteria: x    I&O's Summary    04 Oct 2017 07:  -  05 Oct 2017 07:00  --------------------------------------------------------  IN: 900 mL / OUT: 800 mL / NET: 100 mL    05 Oct 2017 07:01  -  05 Oct 2017 11:39  --------------------------------------------------------  IN: 400 mL / OUT: 375 mL / NET: 25 mL    RADIOLOGY & ADDITIONAL STUDIES:    < from: CT Head No Cont (10.04.17 @ 19:47) >  Stable left subdural hematoma as described. Right-sided midline shift measuring 1.4 cm with subfalcine and sub uncal herniation.     ADVANCE DIRECTIVES: now DNR/I after this consult HPI: 82M with PMH as listed admitted 10/4 with altered mental status, he had a fall two weeks prior to admission. He was found to have large acute on subacute subdural hematoma. Family now ppting to forego aggressive measures.     PERTINENT PMH REVIEWED: Yes     PAST MEDICAL & SURGICAL HISTORY:  CAD (coronary artery disease)  Asthma  HTN (hypertension)  No significant past surgical history    SOCIAL HISTORY:  nonsmoker                                   Admitted from:  home      Surrogate - wife Monica     FAMILY HISTORY:    Baseline ADLs (prior to admission):  Independent     Allergies    No Known Allergies    Present Symptoms:     Dyspnea: 0   Nausea/Vomiting: No  Anxiety:  No  Depression: No  Fatigue: Yes   Loss of appetite: No    Pain: none             Character-            Duration-            Effect-            Factors-            Frequency-            Location-            Severity-    Review of Systems: Reviewed              Unable to obtain due to poor mentation   All others negative    MEDICATIONS  (STANDING):  dextrose 5%. 1000 milliLiter(s) (50 mL/Hr) IV Continuous <Continuous>  dextrose 50% Injectable 12.5 Gram(s) IV Push once  dextrose 50% Injectable 25 Gram(s) IV Push once  dextrose 50% Injectable 25 Gram(s) IV Push once  insulin lispro (HumaLOG) corrective regimen sliding scale   SubCutaneous three times a day before meals  levETIRAcetam  IVPB 500 milliGRAM(s) IV Intermittent every 12 hours  metoprolol Injectable 2.5 milliGRAM(s) IV Push once  sodium chloride 0.9%. 1000 milliLiter(s) (100 mL/Hr) IV Continuous <Continuous>    MEDICATIONS  (PRN):  dextrose Gel 1 Dose(s) Oral once PRN Blood Glucose LESS THAN 70 milliGRAM(s)/deciliter  glucagon  Injectable 1 milliGRAM(s) IntraMuscular once PRN Glucose LESS THAN 70 milligrams/deciliter    PHYSICAL EXAM:    Vital Signs Last 24 Hrs  T(C): 36.6 (05 Oct 2017 11:00), Max: 37 (05 Oct 2017 08:00)  T(F): 97.9 (05 Oct 2017 11:00), Max: 98.6 (05 Oct 2017 08:00)  HR: 82 (05 Oct 2017 11:00) (56 - 98)  BP: 158/70 (05 Oct 2017 11:00) (130/60 - 174/79)  BP(mean): 101 (05 Oct 2017 11:00) (86 - 114)  RR: 53 (05 Oct 2017 11:00) (17 - 148)  SpO2: 95% (05 Oct 2017 11:00) (92% - 97%)    General: alert     Karnofsky:  30 %    HEENT: normal      Lungs: comfortable     CV: normal      GI: normal      : normal     MSK: normal      Skin:  no rash    LABS:                      14.8   11.3  )-----------( 189      ( 05 Oct 2017 05:59 )             43.4     10-05    141  |  106  |  18.0  ----------------------------<  88  4.1   |  24.0  |  1.00    Ca    9.1      05 Oct 2017 05:59  Phos  3.0     10-05  Mg     2.0     10-05    TPro  7.8  /  Alb  4.3  /  TBili  0.4  /  DBili  x   /  AST  23  /  ALT  25  /  AlkPhos  66  10-04    PT/INR - ( 05 Oct 2017 05:59 )   PT: 12.3 sec;   INR: 1.12 ratio       PTT - ( 04 Oct 2017 15:02 )  PTT:29.5 sec  Urinalysis Basic - ( 04 Oct 2017 15:10 )    Color: Yellow / Appearance: Clear / S.015 / pH: x  Gluc: x / Ketone: Negative  / Bili: Negative / Urobili: Negative mg/dL   Blood: x / Protein: 15 mg/dL / Nitrite: Negative   Leuk Esterase: Negative / RBC: x / WBC 0-2   Sq Epi: x / Non Sq Epi: Occasional / Bacteria: x    I&O's Summary    04 Oct 2017 07:  -  05 Oct 2017 07:00  --------------------------------------------------------  IN: 900 mL / OUT: 800 mL / NET: 100 mL    05 Oct 2017 07:  -  05 Oct 2017 11:39  --------------------------------------------------------  IN: 400 mL / OUT: 375 mL / NET: 25 mL    RADIOLOGY & ADDITIONAL STUDIES:    < from: CT Head No Cont (10.04.17 @ 19:47) >  Stable left subdural hematoma as described. Right-sided midline shift measuring 1.4 cm with subfalcine and sub uncal herniation.     ADVANCE DIRECTIVES: now DNR/I after this consult

## 2017-10-05 NOTE — GOALS OF CARE CONVERSATION - PERSONAL ADVANCE DIRECTIVE - CONVERSATION DETAILS
Lengthy meeting with family with Dr. Saha, Quirino Stevens ([facility]). We all explained overall prognosis. They have all come the the agreement that he would not want surgery or other aggressive heroic measures. I explained directives, that if we are not fixing the surgical problem, and he were to decompensate, then it would not make sense to put him on machines as those things will be life long things given irreversible brain injury that we are not surgically correcting and he would not want those things long term. DNR/I agreed to. overall goals are to keep him comfortable. We briefly discussed next steps of hospice care, different locations where that care can be provided - home, nursing home, or inpatient facility, depending on his clinical condition. They will process all the information and we will meet again tomorrow at noon to discuss further.

## 2017-10-05 NOTE — CHART NOTE - NSCHARTNOTEFT_GEN_A_CORE
A family meeting was held with ACS and Palliative Care teams with a licensed medical  present due to the fact that the patient's wife is Korean speaking. Dr. Saha re-explained the risks, benefits, and alternatives of surgical intervention to the family members. With all being said including the fact that the patient will progressively neurologically decline without surgery, the family has decided against surgical intervention and to proceed with comfort care, and patient now has DNR. Palliative care team and ACS aware. No further neurosurgical recommendations. Comfort measures as per SICU/palliative care team. A family meeting was held with ACS and Palliative Care teams with a licensed medical  present due to the fact that the patient's wife is [place] speaking. Dr. Saha re-explained the risks, benefits, and alternatives of surgical intervention to the family members. With all being said including the fact that the patient will progressively neurologically decline without surgery, the family has decided against surgical intervention and to proceed with comfort care, and patient now has DNR. Palliative care team and ACS aware. No further neurosurgical recommendations. Comfort measures as per SICU/palliative care team.    NSGY Attg:    see above  see my separate progress note

## 2017-10-05 NOTE — CONSULT NOTE ADULT - ATTENDING COMMENTS
YESENIA Attg:    see my progress note
COUNSELING:  Face to face meeting to discuss Advanced Care Planning - Time Spent  22 Minutes.  See goals of care note.      Thank you for the opportunity to assist with the care of this patient.   [facility] [phone].

## 2017-10-05 NOTE — PROGRESS NOTE ADULT - SUBJECTIVE AND OBJECTIVE BOX
Acute Care Surgery / Trauma Surgery / SICU  [facility] [place]  ===============================  Interval/Overnight Events: Family declines surgical intervention for SDH. Patient oriented to place and follows simple commands. Awaiting family meeting to discuss  goals of care.    VITAL SIGNS:  T(C): 37 (10-05-17 @ 08:00), Max: 37 (10-05-17 @ 08:00)  HR: 90 (10-05-17 @ 10:00) (56 - 98)  BP: 174/79 (10-05-17 @ 10:00) (130/60 - 174/79)  ABP: --  ABP(mean): --  RR: 34 (10-05-17 @ 10:00) (17 - 148)  SpO2: 94% (10-05-17 @ 10:00) (92% - 97%)  CVP(mm Hg): --    NEUROLOGY:  Neurologic Medications:  levETIRAcetam  IVPB 500 milliGRAM(s) IV Intermittent every 12 hours    Exam:  CAM ICU: neg  [  x ] Adequacy of sedation and pain control has been assessed and adjusted  Comments:    RESPIRATORY:  Respiratory Medications:    Exam: on room air      CARDIOVASCULAR  Cardiovascular Medications:  metoprolol Injectable 2.5 milliGRAM(s) IV Push once    Exam: RRR      Metabolic/FLUIDS/ELECTROLYTES/NUTRITION:  Gastrointestinal Medications:  dextrose 5%. 1000 milliLiter(s) IV Continuous <Continuous>  sodium chloride 0.9%. 1000 milliLiter(s) IV Continuous <Continuous>    I&O's Detail  I&O's Detail    04 Oct 2017 07:01  -  05 Oct 2017 07:00  --------------------------------------------------------  IN:    sodium chloride 0.9%.: 800 mL    Solution: 100 mL  Total IN: 900 mL    OUT:    Voided: 800 mL  Total OUT: 800 mL    Total NET: 100 mL      05 Oct 2017 07:01  -  05 Oct 2017 10:57  --------------------------------------------------------  IN:    sodium chloride 0.9%.: 300 mL  Total IN: 300 mL    OUT:    Incontinent per Condom Catheter: 375 mL  Total OUT: 375 mL    Total NET: -75 mL        Daily   10-05    141  |  106  |  18.0  ----------------------------<  88  4.1   |  24.0  |  1.00    Ca    9.1      05 Oct 2017 05:59  Phos  3.0     10-05  Mg     2.0     10-05    TPro  7.8  /  Alb  4.3  /  TBili  0.4  /  DBili  x   /  AST  23  /  ALT  25  /  AlkPhos  66  10-04      Diet: NPO      Endocrine:  CAPILLARY BLOOD GLUCOSE  88 (05 Oct 2017 06:00)  87 (04 Oct 2017 23:00)          HEMATOLOGIC:  Hematologic/Oncologic Medications:    [x ] DVT Prophylaxis: SCDs                                              14.8                  Neurophils% (auto):   65.5   (10-05 @ 05:59):    11.3 )-----------(189          Lymphocytes% (auto):  12.1                                          43.4                   Eosinphils% (auto):   14.6     Manual%: Neutrophils x    ; Lymphocytes x    ; Eosinophils x    ; Bands%: x    ; Blasts x          ( 10-05 @ 05:59 )   PT: 12.3 sec;   INR: 1.12 ratio  aPTT: x          Comments:    INFECTIOUS DISEASE:    Antimicrobials/Immunologic Medications:    RECENT CULTURES:          ASSESSMENT/PLAN:  82yMaleNon-traumatic subdural hemorrhage  No h/o HF  Handoff  MEWS Score  CAD (coronary artery disease)  Asthma  HTN (hypertension)  Subdural hematoma  Subdural hematoma  No significant past surgical history  AMS  90+      Neuro: Continue serial neurologic examinations. Keppra for 7 day course. Currently family declining surgical intervention. Need family meeting to discuss ultimate goals of care.    CV: Maintain MAPs>65    Pulm: no issues currently    GI/Nutrition: NPO for now    /Renal: Gentle IVF hydration    ENDO: Maintain Euglycemia.    HEME: no indication for transfusion    ID: no active issues    Lines/Tubes: PIV, condom catheter    Skin: no breakdown    Proph: SCDs    Dispo: ICU for now. Dispo TBD after family meeting.    CRITICAL CARE TIME SPENT: 32

## 2017-10-05 NOTE — PROGRESS NOTE ADULT - SUBJECTIVE AND OBJECTIVE BOX
NSGY Attg:    Patient seen and examined.    Exam:  eyes open spontaneously  PERRL  EOMI  FS grossly  worsening expressive aphasia  moves left spontaneously 5/5  increased tone in RLE  not moving RUE against gravity to my exam    CT head -- stable L SDH w MLS    A/P:  patient with worsening aphasia and weakness; CT stable  I participated in a family meeting (using a licensed ) which included the patient's wife and family.  The palliative care service and ACS team participated as well.  I re-explained the risks, benefits, and alternatives of surgical intervention as previously outlined in my first progress note.  I reiterated that although surgery is associated with risks, there is no non-surgical alternative to provide relief of mass effect and that the patient's neurologic status will decline without surgical intervention.  The patient's family verbalize their understanding.  I answered all their questions.  The patient's wife is declining surgical intervention.  - supportive care per palliative care/ICU  - recall prn  - cont Keppra x 7 days total

## 2017-10-05 NOTE — CONSULT NOTE ADULT - PROBLEM SELECTOR RECOMMENDATION 9
- very poor prognosis overall. family opting against surgical intervention at this time. They understand that without surgery he will likely die. They also understand that even with surgery, he may not get better or he may get better. They just want him to be comfortable.

## 2017-10-05 NOTE — CONSULT NOTE ADULT - PROBLEM SELECTOR RECOMMENDATION 3
- bedside speech and swallow. family understands given poor mental state, risk of aspiration. see goals of care note for more details.

## 2017-10-06 PROCEDURE — 99233 SBSQ HOSP IP/OBS HIGH 50: CPT

## 2017-10-06 RX ORDER — ALBUTEROL 90 UG/1
1 AEROSOL, METERED ORAL EVERY 4 HOURS
Qty: 0 | Refills: 0 | Status: DISCONTINUED | OUTPATIENT
Start: 2017-10-06 | End: 2017-10-07

## 2017-10-06 RX ORDER — ROBINUL 0.2 MG/ML
0.4 INJECTION INTRAMUSCULAR; INTRAVENOUS EVERY 6 HOURS
Qty: 0 | Refills: 0 | Status: DISCONTINUED | OUTPATIENT
Start: 2017-10-06 | End: 2017-10-07

## 2017-10-06 RX ORDER — MORPHINE SULFATE 50 MG/1
2 CAPSULE, EXTENDED RELEASE ORAL
Qty: 0 | Refills: 0 | Status: DISCONTINUED | OUTPATIENT
Start: 2017-10-06 | End: 2017-10-07

## 2017-10-06 RX ORDER — ALBUTEROL 90 UG/1
2.5 AEROSOL, METERED ORAL EVERY 6 HOURS
Qty: 0 | Refills: 0 | Status: DISCONTINUED | OUTPATIENT
Start: 2017-10-06 | End: 2017-10-07

## 2017-10-06 RX ADMIN — ROBINUL 0.4 MILLIGRAM(S): 0.2 INJECTION INTRAMUSCULAR; INTRAVENOUS at 18:01

## 2017-10-06 RX ADMIN — LEVETIRACETAM 420 MILLIGRAM(S): 250 TABLET, FILM COATED ORAL at 05:45

## 2017-10-06 RX ADMIN — ALBUTEROL 2.5 MILLIGRAM(S): 90 AEROSOL, METERED ORAL at 16:16

## 2017-10-06 RX ADMIN — ROBINUL 0.4 MILLIGRAM(S): 0.2 INJECTION INTRAMUSCULAR; INTRAVENOUS at 23:02

## 2017-10-06 RX ADMIN — LEVETIRACETAM 420 MILLIGRAM(S): 250 TABLET, FILM COATED ORAL at 16:15

## 2017-10-06 RX ADMIN — AMLODIPINE BESYLATE 5 MILLIGRAM(S): 2.5 TABLET ORAL at 05:45

## 2017-10-06 NOTE — SWALLOW BEDSIDE ASSESSMENT ADULT - SWALLOW EVAL: ORAL MUSCULATURE
Oral care provided prior to evaluation at family request/unable to assess due to poor participation/comprehension

## 2017-10-06 NOTE — SWALLOW BEDSIDE ASSESSMENT ADULT - SWALLOW EVAL: RECOMMENDED FEEDING/EATING TECHNIQUES
allow for swallow between intakes/small sips/bites/crush medication (when feasible)/no straws/position upright (90 degrees)/maintain upright posture during/after eating for 30 mins/oral hygiene

## 2017-10-06 NOTE — PROGRESS NOTE ADULT - ASSESSMENT
82yMaleNon-traumatic subdural hemorrhage, Currently family declining surgical intervention. Need family meeting to discuss ultimate goals of care.  - dysphagia diet  - pain control  - palliative to see
A/P: 82y Male with large left frontal parietal mixed density SDH with 1.5 cm left to right midline shift. Patient seen today, NAD, unable to state his name, R sided weakness slightly worse. Daughter from [place] at bedside, states there has not been a change in the patient's wife's decision of no surgery.  - Will d/w attending  - No further imaging requested at this time being that the most recent CT scan is stable. Should the patient neurologically decline and patient's family considering surgery then stat CT head  - Continue neurochecks Q1 hour, HOB 30 degrees, BP control, hold all antiplatelets/anticoagulation  - Keppra 500 Q12 x  total 7 days  - Supportive care per SICU
IMP: 10/4  CT HEAD F/U EXAM: Stable left subdural hematoma as described. Right-sided midline shift  measuring 1.4 cm with subfalcine and subuncal herniation.    SLIGHT EXAM CHANGE, STILL GCS=14  AGAIN DISCUSSED WITH FAMILY AT BEDSIDE SEVERITY OF SDH.  DISCUSSED UNLIKELY RECOVERY FROM CURRENT CONDITION AND LIKELY WORSENING  WITHOUT SURG  AND THE RISKS OF SURGERY    PATIENTS FAMILY STILL REFUSING SURG , LONG DISCUSSION WITH DR ISABEL AND   ACS PA OF CONDITION AND OPTIONS.    CT HEAD IF ANY CHANGE IN NEURO STATUS AND FAMILY CHANGE IN DESIRE TO HAVE SURGERY.
82M with left SDH, with associated shift and herniation, now on comfort care.

## 2017-10-06 NOTE — PROGRESS NOTE ADULT - SUBJECTIVE AND OBJECTIVE BOX
INTERVAL HPI/OVERNIGHT EVENTS:    STATUS POST:      POST OPERATIVE DAY #:     SUBJECTIVE:  Pt seen and examined in the am. Pt is alert and oriented to self and place. No overnight events. No complaints at this time. Family to discuss palliative options.       MEDICATIONS  (STANDING):  ALBUTerol    90 MICROgram(s) HFA Inhaler 1 Puff(s) Inhalation every 4 hours  glycopyrrolate Injectable 0.4 milliGRAM(s) IV Push every 6 hours  levETIRAcetam  IVPB 500 milliGRAM(s) IV Intermittent every 12 hours    MEDICATIONS  (PRN):  ALBUTerol    0.083% 2.5 milliGRAM(s) Nebulizer every 6 hours PRN Shortness of Breath and/or Wheezing  LORazepam   Injectable 0.5 milliGRAM(s) IV Push every 4 hours PRN agitation, anxiety  morphine  - Injectable 2 milliGRAM(s) IV Push every 2 hours PRN pain  morphine  - Injectable 2 milliGRAM(s) IV Push every 2 hours PRN dyspnea      Vital Signs Last 24 Hrs  T(C): 36.7 (06 Oct 2017 16:07), Max: 36.8 (05 Oct 2017 20:00)  T(F): 98.1 (06 Oct 2017 16:07), Max: 98.2 (05 Oct 2017 20:00)  HR: 79 (06 Oct 2017 16:07) (61 - 85)  BP: 126/71 (06 Oct 2017 16:07) (126/71 - 177/79)  BP(mean): 95 (05 Oct 2017 21:00) (95 - 113)  RR: 18 (06 Oct 2017 16:07) (18 - 51)  SpO2: 94% (06 Oct 2017 16:07) (93% - 94%)    PHYSICAL EXAM:      Constitutional: arousable, Alert     Respiratory: clear lungs b/l     Cardiovascular: RRR    Gastrointestinal: soft, ND NT             I&O's Detail    05 Oct 2017 07:01  -  06 Oct 2017 07:00  --------------------------------------------------------  IN:    sodium chloride 0.9%: 700 mL  Total IN: 700 mL    OUT:    Incontinent per Condom Catheter: 600 mL  Total OUT: 600 mL    Total NET: 100 mL          LABS:                        14.8   11.3  )-----------( 189      ( 05 Oct 2017 05:59 )             43.4     10-05    141  |  106  |  18.0  ----------------------------<  88  4.1   |  24.0  |  1.00    Ca    9.1      05 Oct 2017 05:59  Phos  3.0     10-05  Mg     2.0     10-05      PT/INR - ( 05 Oct 2017 05:59 )   PT: 12.3 sec;   INR: 1.12 ratio               RADIOLOGY & ADDITIONAL STUDIES:

## 2017-10-06 NOTE — PROGRESS NOTE ADULT - SUBJECTIVE AND OBJECTIVE BOX
OVERNIGHT EVENTS:    BRIEF HOSPITAL COURSE:    Present Symptoms:     Dyspnea: 0 1 2 3   Nausea/Vomiting: Yes No  Anxiety:  Yes No  Depression: Yes No  Fatigue: Yes No  Loss of appetite: Yes No    Pain:             Character-            Duration-            Effect-            Factors-            Frequency-            Location-            Severity-    Review of Systems: Reviewed                     Negative:                     Positive:  Unable to obtain due to poor mentation   All others negative    MEDICATIONS  (STANDING):  ALBUTerol    90 MICROgram(s) HFA Inhaler 1 Puff(s) Inhalation every 4 hours  glycopyrrolate Injectable 0.4 milliGRAM(s) IV Push every 6 hours  levETIRAcetam  IVPB 500 milliGRAM(s) IV Intermittent every 12 hours    MEDICATIONS  (PRN):  ALBUTerol    0.083% 2.5 milliGRAM(s) Nebulizer every 6 hours PRN Shortness of Breath and/or Wheezing  LORazepam   Injectable 0.5 milliGRAM(s) IV Push every 4 hours PRN agitation, anxiety  morphine  - Injectable 2 milliGRAM(s) IV Push every 2 hours PRN pain  morphine  - Injectable 2 milliGRAM(s) IV Push every 2 hours PRN dyspnea      PHYSICAL EXAM:    Vital Signs Last 24 Hrs  T(C): 36.7 (06 Oct 2017 16:07), Max: 36.8 (05 Oct 2017 20:00)  T(F): 98.1 (06 Oct 2017 16:07), Max: 98.2 (05 Oct 2017 20:00)  HR: 79 (06 Oct 2017 16:07) (61 - 85)  BP: 126/71 (06 Oct 2017 16:07) (126/71 - 177/79)  BP(mean): 95 (05 Oct 2017 21:00) (95 - 113)  RR: 18 (06 Oct 2017 16:07) (18 - 51)  SpO2: 94% (06 Oct 2017 16:07) (93% - 94%)    General: alert  oriented x ____ lethargic agitated                  cachexia  nonverbal  coma    Karnofsky:  %    HEENT: normal  dry mouth  ET tube/trach    Lungs: comfortable tachypnea/labored breathing  excessive secretions    CV: normal  tachycardia    GI: normal  distended  tender  no BS               PEG/NG/OG tube  constipation  last BM:     : normal  incontinent  oliguria/anuria  kramer    MSK: normal  weakness  edema             ambulatory  bedbound/wheelchair bound    Skin: normal  pressure ulcers- Stage_____  no rash    LABS:                          14.8   11.3  )-----------( 189      ( 05 Oct 2017 05:59 )             43.4     10-05    141  |  106  |  18.0  ----------------------------<  88  4.1   |  24.0  |  1.00    Ca    9.1      05 Oct 2017 05:59  Phos  3.0     10-05  Mg     2.0     10-05      PT/INR - ( 05 Oct 2017 05:59 )   PT: 12.3 sec;   INR: 1.12 ratio             I&O's Summary    05 Oct 2017 07:01  -  06 Oct 2017 07:00  --------------------------------------------------------  IN: 700 mL / OUT: 600 mL / NET: 100 mL        RADIOLOGY & ADDITIONAL STUDIES:    ADVANCE DIRECTIVES:   DNR YES NO  Completed on:                     MOLST  YES NO   Completed on:  Living Will  YES NO   Completed on: OVERNIGHT EVENTS: doing poorly. more lethargic     Present Symptoms:     Dyspnea: 1   Nausea/Vomiting: No  Anxiety:  No  Depression: Yes No  Fatigue: Yes No  Loss of appetite: Yes No    Pain:             Character-            Duration-            Effect-            Factors-            Frequency-            Location-            Severity-    Review of Systems: Reviewed                     Negative:                     Positive:  Unable to obtain due to poor mentation   All others negative    MEDICATIONS  (STANDING):  ALBUTerol    90 MICROgram(s) HFA Inhaler 1 Puff(s) Inhalation every 4 hours  glycopyrrolate Injectable 0.4 milliGRAM(s) IV Push every 6 hours  levETIRAcetam  IVPB 500 milliGRAM(s) IV Intermittent every 12 hours    MEDICATIONS  (PRN):  ALBUTerol    0.083% 2.5 milliGRAM(s) Nebulizer every 6 hours PRN Shortness of Breath and/or Wheezing  LORazepam   Injectable 0.5 milliGRAM(s) IV Push every 4 hours PRN agitation, anxiety  morphine  - Injectable 2 milliGRAM(s) IV Push every 2 hours PRN pain  morphine  - Injectable 2 milliGRAM(s) IV Push every 2 hours PRN dyspnea      PHYSICAL EXAM:    Vital Signs Last 24 Hrs  T(C): 36.7 (06 Oct 2017 16:07), Max: 36.8 (05 Oct 2017 20:00)  T(F): 98.1 (06 Oct 2017 16:07), Max: 98.2 (05 Oct 2017 20:00)  HR: 79 (06 Oct 2017 16:07) (61 - 85)  BP: 126/71 (06 Oct 2017 16:07) (126/71 - 177/79)  BP(mean): 95 (05 Oct 2017 21:00) (95 - 113)  RR: 18 (06 Oct 2017 16:07) (18 - 51)  SpO2: 94% (06 Oct 2017 16:07) (93% - 94%)    General: alert  oriented x ____ lethargic agitated                  cachexia  nonverbal  coma    Karnofsky:  %    HEENT: normal  dry mouth  ET tube/trach    Lungs: comfortable tachypnea/labored breathing  excessive secretions    CV: normal  tachycardia    GI: normal  distended  tender  no BS               PEG/NG/OG tube  constipation  last BM:     : normal  incontinent  oliguria/anuria  kramer    MSK: normal  weakness  edema             ambulatory  bedbound/wheelchair bound    Skin: normal  pressure ulcers- Stage_____  no rash    LABS:                          14.8   11.3  )-----------( 189      ( 05 Oct 2017 05:59 )             43.4     10-05    141  |  106  |  18.0  ----------------------------<  88  4.1   |  24.0  |  1.00    Ca    9.1      05 Oct 2017 05:59  Phos  3.0     10-05  Mg     2.0     10-05      PT/INR - ( 05 Oct 2017 05:59 )   PT: 12.3 sec;   INR: 1.12 ratio             I&O's Summary    05 Oct 2017 07:01  -  06 Oct 2017 07:00  --------------------------------------------------------  IN: 700 mL / OUT: 600 mL / NET: 100 mL        RADIOLOGY & ADDITIONAL STUDIES:    ADVANCE DIRECTIVES:   DNR YES NO  Completed on:                     MOLST  YES NO   Completed on:  Living Will  YES NO   Completed on: OVERNIGHT EVENTS: doing poorly. more lethargic     Present Symptoms:     Dyspnea: 1   Nausea/Vomiting: No  Anxiety:  No  Depression: unable   Fatigue: Yes   Loss of appetite: unable     Pain:  none             Character-            Duration-            Effect-            Factors-            Frequency-            Location-            Severity-    Review of Systems: Reviewed                    Unable to obtain due to poor mentation   All others negative    MEDICATIONS  (STANDING):  ALBUTerol    90 MICROgram(s) HFA Inhaler 1 Puff(s) Inhalation every 4 hours  glycopyrrolate Injectable 0.4 milliGRAM(s) IV Push every 6 hours  levETIRAcetam  IVPB 500 milliGRAM(s) IV Intermittent every 12 hours    MEDICATIONS  (PRN):  ALBUTerol    0.083% 2.5 milliGRAM(s) Nebulizer every 6 hours PRN Shortness of Breath and/or Wheezing  LORazepam   Injectable 0.5 milliGRAM(s) IV Push every 4 hours PRN agitation, anxiety  morphine  - Injectable 2 milliGRAM(s) IV Push every 2 hours PRN pain  morphine  - Injectable 2 milliGRAM(s) IV Push every 2 hours PRN dyspnea    PHYSICAL EXAM:    Vital Signs Last 24 Hrs  T(C): 36.7 (06 Oct 2017 16:07), Max: 36.8 (05 Oct 2017 20:00)  T(F): 98.1 (06 Oct 2017 16:07), Max: 98.2 (05 Oct 2017 20:00)  HR: 79 (06 Oct 2017 16:07) (61 - 85)  BP: 126/71 (06 Oct 2017 16:07) (126/71 - 177/79)  BP(mean): 95 (05 Oct 2017 21:00) (95 - 113)  RR: 18 (06 Oct 2017 16:07) (18 - 51)  SpO2: 94% (06 Oct 2017 16:07) (93% - 94%)    General: lethargic, unable to respond, cachexia     Karnofsky:  20 %    HEENT:  dry mouth     Lungs: comfortable    CV: normal      GI: normal      : normal     MSK: weakness      Skin: no rash    LABS:                      14.8   11.3  )-----------( 189      ( 05 Oct 2017 05:59 )             43.4     10-05    141  |  106  |  18.0  ----------------------------<  88  4.1   |  24.0  |  1.00    Ca    9.1      05 Oct 2017 05:59  Phos  3.0     10-05  Mg     2.0     10-05    PT/INR - ( 05 Oct 2017 05:59 )   PT: 12.3 sec;   INR: 1.12 ratio       I&O's Summary    05 Oct 2017 07:01  -  06 Oct 2017 07:00  --------------------------------------------------------  IN: 700 mL / OUT: 600 mL / NET: 100 mL    RADIOLOGY & ADDITIONAL STUDIES:    ADVANCE DIRECTIVES: DNR/I

## 2017-10-06 NOTE — PROGRESS NOTE ADULT - PROBLEM SELECTOR PLAN 4
- met with family at length, they understand his prognosis is grave, and that we are looking at a time frame of hours to days at this point. I recommended inpatient hospice  transfer, they understand and agree. Hospice transfer once bed available.

## 2017-10-06 NOTE — SWALLOW BEDSIDE ASSESSMENT ADULT - SWALLOW EVAL: DIAGNOSIS
Oral and pharyngeal stages of swallow appear WFL for puree and honey thick liquids however pt at risk for aspiration given current medical status

## 2017-10-06 NOTE — PROGRESS NOTE ADULT - ATTENDING COMMENTS
NSGY Attg:    see above  see my progress note
PLAN:  DISCUSSED WITH DR ISABEL,  CT HEAD IF ANY CHANGE IN NEURO STATUS AND FAMILY CHANGE IN DESIRE TO HAVE SURGERY.
Thank you for the opportunity to assist with the care of this patient.   [facility] [phone].

## 2017-10-06 NOTE — SWALLOW BEDSIDE ASSESSMENT ADULT - ASR SWALLOW ASPIRATION MONITOR
oral hygiene/fever/change of breathing pattern/position upright (90Y)/cough/pneumonia/throat clearing/gurgly voice/upper respiratory infection

## 2017-10-07 ENCOUNTER — TRANSCRIPTION ENCOUNTER (OUTPATIENT)
Age: 82
End: 2017-10-07

## 2017-10-07 VITALS — OXYGEN SATURATION: 92 %

## 2017-10-07 PROCEDURE — 94640 AIRWAY INHALATION TREATMENT: CPT

## 2017-10-07 PROCEDURE — 83735 ASSAY OF MAGNESIUM: CPT

## 2017-10-07 PROCEDURE — 83036 HEMOGLOBIN GLYCOSYLATED A1C: CPT

## 2017-10-07 PROCEDURE — 70450 CT HEAD/BRAIN W/O DYE: CPT

## 2017-10-07 PROCEDURE — 71045 X-RAY EXAM CHEST 1 VIEW: CPT

## 2017-10-07 PROCEDURE — 85730 THROMBOPLASTIN TIME PARTIAL: CPT

## 2017-10-07 PROCEDURE — 81001 URINALYSIS AUTO W/SCOPE: CPT

## 2017-10-07 PROCEDURE — 93005 ELECTROCARDIOGRAM TRACING: CPT

## 2017-10-07 PROCEDURE — 86850 RBC ANTIBODY SCREEN: CPT

## 2017-10-07 PROCEDURE — 96374 THER/PROPH/DIAG INJ IV PUSH: CPT

## 2017-10-07 PROCEDURE — 80048 BASIC METABOLIC PNL TOTAL CA: CPT

## 2017-10-07 PROCEDURE — 85610 PROTHROMBIN TIME: CPT

## 2017-10-07 PROCEDURE — 84443 ASSAY THYROID STIM HORMONE: CPT

## 2017-10-07 PROCEDURE — 86900 BLOOD TYPING SEROLOGIC ABO: CPT

## 2017-10-07 PROCEDURE — 92610 EVALUATE SWALLOWING FUNCTION: CPT

## 2017-10-07 PROCEDURE — 85027 COMPLETE CBC AUTOMATED: CPT

## 2017-10-07 PROCEDURE — 84484 ASSAY OF TROPONIN QUANT: CPT

## 2017-10-07 PROCEDURE — 80053 COMPREHEN METABOLIC PANEL: CPT

## 2017-10-07 PROCEDURE — 84100 ASSAY OF PHOSPHORUS: CPT

## 2017-10-07 PROCEDURE — 36415 COLL VENOUS BLD VENIPUNCTURE: CPT

## 2017-10-07 PROCEDURE — 87086 URINE CULTURE/COLONY COUNT: CPT

## 2017-10-07 PROCEDURE — 99285 EMERGENCY DEPT VISIT HI MDM: CPT | Mod: 25

## 2017-10-07 PROCEDURE — 86901 BLOOD TYPING SEROLOGIC RH(D): CPT

## 2017-10-07 PROCEDURE — T1013: CPT

## 2017-10-07 RX ORDER — LEVETIRACETAM 250 MG/1
5 TABLET, FILM COATED ORAL
Qty: 0 | Refills: 0 | COMMUNITY
Start: 2017-10-07

## 2017-10-07 RX ORDER — ALBUTEROL 90 UG/1
2.5 AEROSOL, METERED ORAL
Qty: 0 | Refills: 0 | COMMUNITY
Start: 2017-10-07

## 2017-10-07 RX ORDER — MORPHINE SULFATE 50 MG/1
2 CAPSULE, EXTENDED RELEASE ORAL
Qty: 0 | Refills: 0 | COMMUNITY
Start: 2017-10-07

## 2017-10-07 RX ORDER — ROBINUL 0.2 MG/ML
0.4 INJECTION INTRAMUSCULAR; INTRAVENOUS
Qty: 0 | Refills: 0 | COMMUNITY
Start: 2017-10-07

## 2017-10-07 RX ORDER — ALBUTEROL 90 UG/1
1 AEROSOL, METERED ORAL
Qty: 0 | Refills: 0 | COMMUNITY
Start: 2017-10-07

## 2017-10-07 RX ADMIN — ALBUTEROL 2.5 MILLIGRAM(S): 90 AEROSOL, METERED ORAL at 10:35

## 2017-10-07 RX ADMIN — ROBINUL 0.4 MILLIGRAM(S): 0.2 INJECTION INTRAMUSCULAR; INTRAVENOUS at 05:52

## 2017-10-07 RX ADMIN — ROBINUL 0.4 MILLIGRAM(S): 0.2 INJECTION INTRAMUSCULAR; INTRAVENOUS at 13:21

## 2017-10-07 RX ADMIN — LEVETIRACETAM 420 MILLIGRAM(S): 250 TABLET, FILM COATED ORAL at 05:52

## 2017-10-07 NOTE — DISCHARGE NOTE ADULT - CARE PLAN
Principal Discharge DX:	Subdural hematoma  Goal:	to be pain free  Instructions for follow-up, activity and diet:	You will go to the hospice in where you will continue to get comfort care. No followup needed at the trauma or neurosurgery outpatient clinic. For any questions or concerns please feel free to call [phone]

## 2017-10-07 NOTE — DISCHARGE NOTE ADULT - HOSPITAL COURSE
81 y/o M brought to the ED by family members with complaints of altered mental status for past few days. As per the family they are not sure if the pt had a fall. pt is able to ambulate himself. family and pt denies N/V/F  PMH: HTN, high cholesterol, CAD, asthma  PSH: cardiac stents  Medication: Exforge 5/160 OD, lipitor 20 OD, montelukast 10 OD, aspirin 325  Cardiologist: Dr. morena Braun  PMD: Dr. Colleen Pang    Patient had a ct scan of the cervical spine that was negative, head ct scan showed Acute moderate to large left frontal parietal convexity  mixed density subdural hemorrhage with a 1.5 cm left-to-right midline shift. Patient was admitted to the ICU and neurosurgery consulted. Family decided that they will not proceed with surgical internvetion and comfort care only. Patient will go to the hospice in today 10/7

## 2017-10-07 NOTE — DISCHARGE NOTE ADULT - MEDICATION SUMMARY - MEDICATIONS TO TAKE
I will START or STAY ON the medications listed below when I get home from the hospital:    morphine  -- 2 milligram(s) intravenous every 2 hours, As Needed for dsypnea  -- Indication: For Palliative care patient    morphine  -- 2 milligram(s) intravenous every 2 hours, As Needed for pain   -- Indication: For Palliative care patient    LORazepam  -- 0.5 milligram(s) intravenous every 4 hours, As Needed for agitiation   -- Indication: For Palliative care patient    levETIRAcetam 100 mg/mL intravenous solution  -- 5 milliliter(s) intravenous every 12 hours  -- Indication: For Palliative care patient    albuterol 2.5 mg/3 mL (0.083%) inhalation solution  -- 2.5 unit(s) inhaled every 6 hours, As Needed for shortness of breath   -- Indication: For Palliative care patient    albuterol 90 mcg/inh inhalation aerosol  -- 1 puff(s) inhaled every 4 hours  -- Indication: For Palliative care patient    glycopyrrolate 0.2 mg/mL injectable solution  -- 0.4 milligram(s) injectable every 6 hours  -- Indication: For Palliative care patient

## 2017-10-07 NOTE — DISCHARGE NOTE ADULT - PATIENT PORTAL LINK FT
“You can access the [facility] Patient Portal, offered by [facility], by registering with the following website: [email or web address]”

## 2017-10-07 NOTE — DISCHARGE NOTE ADULT - PLAN OF CARE
to be pain free You will go to the hospice in where you will continue to get comfort care. No followup needed at the trauma or neurosurgery outpatient clinic. For any questions or concerns please feel free to call [phone]

## 2017-10-07 NOTE — DISCHARGE NOTE ADULT - MEDICATION SUMMARY - MEDICATIONS TO STOP TAKING
I will STOP taking the medications listed below when I get home from the hospital:    Exforge 5 mg-160 mg oral tablet  -- 1 tab(s) by mouth once a day    Advair Diskus 250 mcg-50 mcg inhalation powder  -- 1  inhaled once a day    Proventil  --  inhaled , As Needed

## 2018-12-12 NOTE — ED ADULT TRIAGE NOTE - NS ED NOTE AC HIGH RISK COUNTRIES
Margarita    Can you hunt down pts heart cath report from Dr. Orozco, [facility].  Was done in Sept 2018.    Thanks    Dr Villanueva   Other Countries/No

## 2022-06-09 NOTE — INPATIENT CERTIFICATION FOR MEDICARE PATIENTS - PHYSICIAN CONCUR
I concur with the Admission Order and I certify that services are provided in accordance with Section 42 CFR § 412.3 Refer to the Assessment tab to view/cancel completed assessment.

## 2023-02-15 NOTE — DISCHARGE NOTE ADULT - NSTOBACCONEVERSMOKERY/N_GEN_A
Head, normocephalic, atraumatic, Face, Face within normal limits, Ears, External ears within normal limits, Nose/Nasopharynx, External nose  normal appearance, nares patent, no nasal discharge, Mouth and Throat, Oral cavity appearance normal, Breath odor normal, Lips, Appearance normal No

## 2024-01-01 NOTE — ED ADULT NURSE NOTE - CHIEF COMPLAINT QUOTE
C/O dizziness and confusion x 2 days. Upon every assessment question, patient responds [id number] or 1936. Daughter states patient usually alert and oriented x4 and ambulatory with steady gait. Ambulatory with c/o dizziness, no facial asym present. Able to AVILA with strength and purpose. Follows commands. Denies recent trauma, injury, or falls. 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
